# Patient Record
Sex: FEMALE | Race: WHITE | NOT HISPANIC OR LATINO | URBAN - METROPOLITAN AREA
[De-identification: names, ages, dates, MRNs, and addresses within clinical notes are randomized per-mention and may not be internally consistent; named-entity substitution may affect disease eponyms.]

---

## 2018-07-30 ENCOUNTER — APPOINTMENT (OUTPATIENT)
Dept: WOMENS IMAGING | Facility: HOSPITAL | Age: 48
End: 2018-07-30

## 2018-07-30 PROCEDURE — 77065 DX MAMMO INCL CAD UNI: CPT | Performed by: RADIOLOGY

## 2018-07-30 PROCEDURE — 77061 BREAST TOMOSYNTHESIS UNI: CPT | Performed by: RADIOLOGY

## 2018-07-30 PROCEDURE — G0279 TOMOSYNTHESIS, MAMMO: HCPCS | Performed by: RADIOLOGY

## 2018-07-30 PROCEDURE — 76641 ULTRASOUND BREAST COMPLETE: CPT | Performed by: RADIOLOGY

## 2020-03-12 ENCOUNTER — OFFICE (OUTPATIENT)
Dept: URBAN - METROPOLITAN AREA CLINIC 94 | Facility: CLINIC | Age: 50
End: 2020-03-12
Payer: COMMERCIAL

## 2020-03-12 VITALS
WEIGHT: 165 LBS | HEIGHT: 66 IN | HEART RATE: 109 BPM | DIASTOLIC BLOOD PRESSURE: 81 MMHG | SYSTOLIC BLOOD PRESSURE: 132 MMHG

## 2020-03-12 DIAGNOSIS — K31.84 GASTROPARESIS: ICD-10-CM

## 2020-03-12 DIAGNOSIS — R14.2 ERUCTATION: ICD-10-CM

## 2020-03-12 DIAGNOSIS — R14.0 ABDOMINAL DISTENSION (GASEOUS): ICD-10-CM

## 2020-03-12 DIAGNOSIS — K21.9 GASTRO-ESOPHAGEAL REFLUX DISEASE WITHOUT ESOPHAGITIS: ICD-10-CM

## 2020-03-12 DIAGNOSIS — K85.90 ACUTE PANCREATITIS WITHOUT NECROSIS OR INFECTION, UNSPECIFIE: ICD-10-CM

## 2020-03-12 DIAGNOSIS — R93.3 ABNORMAL FINDINGS ON DIAGNOSTIC IMAGING OF OTHER PARTS OF DI: ICD-10-CM

## 2020-03-12 DIAGNOSIS — K75.81 NONALCOHOLIC STEATOHEPATITIS (NASH): ICD-10-CM

## 2020-03-12 DIAGNOSIS — E11.9 TYPE 2 DIABETES MELLITUS WITHOUT COMPLICATIONS: ICD-10-CM

## 2020-03-12 PROCEDURE — 99244 OFF/OP CNSLTJ NEW/EST MOD 40: CPT | Performed by: INTERNAL MEDICINE

## 2020-03-12 RX ORDER — METOCLOPRAMIDE 5 MG/1
TABLET ORAL
Qty: 120 | Refills: 1 | Status: ACTIVE

## 2020-04-07 VITALS — HEIGHT: 66 IN | WEIGHT: 165 LBS

## 2020-04-09 ENCOUNTER — TELEHEALTH PROVIDED OTHER THAN IN PATIENT'S HOME (OUTPATIENT)
Dept: URBAN - METROPOLITAN AREA TELEHEALTH 6 | Facility: TELEHEALTH | Age: 50
End: 2020-04-09
Payer: COMMERCIAL

## 2020-04-09 DIAGNOSIS — K85.90 ACUTE PANCREATITIS WITHOUT NECROSIS OR INFECTION, UNSPECIFIE: ICD-10-CM

## 2020-04-09 DIAGNOSIS — R93.3 ABNORMAL FINDINGS ON DIAGNOSTIC IMAGING OF OTHER PARTS OF DI: ICD-10-CM

## 2020-04-09 DIAGNOSIS — K21.9 GASTRO-ESOPHAGEAL REFLUX DISEASE WITHOUT ESOPHAGITIS: ICD-10-CM

## 2020-04-09 DIAGNOSIS — R14.0 ABDOMINAL DISTENSION (GASEOUS): ICD-10-CM

## 2020-04-09 DIAGNOSIS — K31.84 GASTROPARESIS: ICD-10-CM

## 2020-04-09 DIAGNOSIS — E11.9 TYPE 2 DIABETES MELLITUS WITHOUT COMPLICATIONS: ICD-10-CM

## 2020-04-09 DIAGNOSIS — R14.2 ERUCTATION: ICD-10-CM

## 2020-04-09 DIAGNOSIS — K75.81 NONALCOHOLIC STEATOHEPATITIS (NASH): ICD-10-CM

## 2020-04-09 DIAGNOSIS — Z12.11 ENCOUNTER FOR SCREENING FOR MALIGNANT NEOPLASM OF COLON: ICD-10-CM

## 2020-04-09 PROCEDURE — 99213 OFFICE O/P EST LOW 20 MIN: CPT | Mod: 95 | Performed by: INTERNAL MEDICINE

## 2020-04-09 NOTE — SERVICEHPINOTES
3/12/2020 I did have the pleasure to see MIGUELITO PA 49 female 1970 in our [facility] for a consultation today. I sincerely appreciate this kind referral. 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 vague historian seems to have been happening for a while maybe 2-3 months ago Johanne tried IBGard hard to tell if helpfulfatty liver -- has had a liver bx 2011 no records available then another liver biopsy during one of her several EUS exams of pancreas takes Vit E pancreatitis several yrs ago not recurrent attributed to Topirimatehas had 2 colonoscopies with Dr Tobin some rectal bleeding in past none nowBRheartburn controlled on OMEPBRlabs 12/2019 AST 18 ALT 27 alk phos 175  chol 243 Hb A1c 11.8%BRvit D is lo BRsugars are better per her report BRwent to [facility]V/pain last weekend she thought it was her pancreas but was told UTI CT was done BRshe does have early satiety she tells me she has gastroparesisBRPertinent positive symptoms include abdominal pain, belching, bloating, change in bowel habits, flatulence/rectal gas, heartburn/reflux, nausea, vomiting pertinent negative symptoms include black stools, constipation, diarrhea, difficulty swallowing, painful swallowing, mucous in stools, painful stools, rectal bleeding, rectal protusions, rectal urgency, soiling/incontinence, vomiting with blood.BR======================================== 4/9/20 Telemedicine visit due to coronavirus pandemic --- BRnausea is better with Reglan 5 mg QID   nbspBRstill bloating is main complaint  also gas/flatulence BRreviewed CT scan but still no old records from Dr Tobin office   .

## 2020-07-16 ENCOUNTER — OFFICE (OUTPATIENT)
Dept: URBAN - METROPOLITAN AREA CLINIC 94 | Facility: CLINIC | Age: 50
End: 2020-07-16

## 2020-07-16 VITALS — WEIGHT: 166 LBS | HEIGHT: 66 IN

## 2020-07-16 DIAGNOSIS — R93.3 ABNORMAL FINDINGS ON DIAGNOSTIC IMAGING OF OTHER PARTS OF DI: ICD-10-CM

## 2020-07-16 DIAGNOSIS — R14.2 ERUCTATION: ICD-10-CM

## 2020-07-16 DIAGNOSIS — Z12.11 ENCOUNTER FOR SCREENING FOR MALIGNANT NEOPLASM OF COLON: ICD-10-CM

## 2020-07-16 DIAGNOSIS — K21.9 GASTRO-ESOPHAGEAL REFLUX DISEASE WITHOUT ESOPHAGITIS: ICD-10-CM

## 2020-07-16 DIAGNOSIS — K31.84 GASTROPARESIS: ICD-10-CM

## 2020-07-16 DIAGNOSIS — K59.00 CONSTIPATION, UNSPECIFIED: ICD-10-CM

## 2020-07-16 DIAGNOSIS — K85.90 ACUTE PANCREATITIS WITHOUT NECROSIS OR INFECTION, UNSPECIFIE: ICD-10-CM

## 2020-07-16 DIAGNOSIS — R11.0 NAUSEA: ICD-10-CM

## 2020-07-16 DIAGNOSIS — E11.9 TYPE 2 DIABETES MELLITUS WITHOUT COMPLICATIONS: ICD-10-CM

## 2020-07-16 DIAGNOSIS — K75.81 NONALCOHOLIC STEATOHEPATITIS (NASH): ICD-10-CM

## 2020-07-16 DIAGNOSIS — R14.0 ABDOMINAL DISTENSION (GASEOUS): ICD-10-CM

## 2020-07-16 PROCEDURE — 99214 OFFICE O/P EST MOD 30 MIN: CPT | Performed by: INTERNAL MEDICINE

## 2020-07-16 RX ORDER — PRUCALOPRIDE 2 MG/1
2 TABLET, FILM COATED ORAL
Qty: 30 | Refills: 11 | Status: ACTIVE
Start: 2020-07-16

## 2021-05-13 ENCOUNTER — TREATMENT (OUTPATIENT)
Dept: PHYSICAL THERAPY | Facility: CLINIC | Age: 51
End: 2021-05-13

## 2021-05-13 DIAGNOSIS — R29.898 LOSS OF MOVEMENT: ICD-10-CM

## 2021-05-13 DIAGNOSIS — R26.9 ABNORMAL GAIT: ICD-10-CM

## 2021-05-13 DIAGNOSIS — R29.3 POOR POSTURE: ICD-10-CM

## 2021-05-13 DIAGNOSIS — M54.42 CHRONIC LEFT-SIDED LOW BACK PAIN WITH LEFT-SIDED SCIATICA: Primary | ICD-10-CM

## 2021-05-13 DIAGNOSIS — G89.29 CHRONIC LEFT-SIDED LOW BACK PAIN WITH LEFT-SIDED SCIATICA: Primary | ICD-10-CM

## 2021-05-13 PROCEDURE — 97110 THERAPEUTIC EXERCISES: CPT | Performed by: PHYSICAL THERAPIST

## 2021-05-13 PROCEDURE — 97162 PT EVAL MOD COMPLEX 30 MIN: CPT | Performed by: PHYSICAL THERAPIST

## 2021-05-28 ENCOUNTER — TREATMENT (OUTPATIENT)
Dept: PHYSICAL THERAPY | Facility: CLINIC | Age: 51
End: 2021-05-28

## 2021-05-28 DIAGNOSIS — M54.42 CHRONIC LEFT-SIDED LOW BACK PAIN WITH LEFT-SIDED SCIATICA: Primary | ICD-10-CM

## 2021-05-28 DIAGNOSIS — G89.29 CHRONIC LEFT-SIDED LOW BACK PAIN WITH LEFT-SIDED SCIATICA: Primary | ICD-10-CM

## 2021-05-28 DIAGNOSIS — R29.3 POOR POSTURE: ICD-10-CM

## 2021-05-28 DIAGNOSIS — R26.9 ABNORMAL GAIT: ICD-10-CM

## 2021-05-28 DIAGNOSIS — R29.898 LOSS OF MOVEMENT: ICD-10-CM

## 2021-05-28 PROCEDURE — 97113 AQUATIC THERAPY/EXERCISES: CPT | Performed by: PHYSICAL THERAPIST

## 2021-06-02 ENCOUNTER — TREATMENT (OUTPATIENT)
Dept: PHYSICAL THERAPY | Facility: CLINIC | Age: 51
End: 2021-06-02

## 2021-06-02 DIAGNOSIS — R29.898 LOSS OF MOVEMENT: ICD-10-CM

## 2021-06-02 DIAGNOSIS — R29.3 POOR POSTURE: ICD-10-CM

## 2021-06-02 DIAGNOSIS — R26.9 ABNORMAL GAIT: ICD-10-CM

## 2021-06-02 DIAGNOSIS — G89.29 CHRONIC LEFT-SIDED LOW BACK PAIN WITH LEFT-SIDED SCIATICA: Primary | ICD-10-CM

## 2021-06-02 DIAGNOSIS — M54.42 CHRONIC LEFT-SIDED LOW BACK PAIN WITH LEFT-SIDED SCIATICA: Primary | ICD-10-CM

## 2021-06-02 PROCEDURE — 97113 AQUATIC THERAPY/EXERCISES: CPT | Performed by: PHYSICAL THERAPIST

## 2021-06-04 ENCOUNTER — TREATMENT (OUTPATIENT)
Dept: PHYSICAL THERAPY | Facility: CLINIC | Age: 51
End: 2021-06-04

## 2021-06-04 DIAGNOSIS — R29.3 POOR POSTURE: ICD-10-CM

## 2021-06-04 DIAGNOSIS — G89.29 CHRONIC LEFT-SIDED LOW BACK PAIN WITH LEFT-SIDED SCIATICA: Primary | ICD-10-CM

## 2021-06-04 DIAGNOSIS — R29.898 LOSS OF MOVEMENT: ICD-10-CM

## 2021-06-04 DIAGNOSIS — M54.42 CHRONIC LEFT-SIDED LOW BACK PAIN WITH LEFT-SIDED SCIATICA: Primary | ICD-10-CM

## 2021-06-04 DIAGNOSIS — R26.9 ABNORMAL GAIT: ICD-10-CM

## 2021-06-04 PROCEDURE — 97113 AQUATIC THERAPY/EXERCISES: CPT | Performed by: PHYSICAL THERAPIST

## 2021-06-08 ENCOUNTER — TREATMENT (OUTPATIENT)
Dept: PHYSICAL THERAPY | Facility: CLINIC | Age: 51
End: 2021-06-08

## 2021-06-08 DIAGNOSIS — R29.3 POOR POSTURE: ICD-10-CM

## 2021-06-08 DIAGNOSIS — G89.29 CHRONIC LEFT-SIDED LOW BACK PAIN WITH LEFT-SIDED SCIATICA: Primary | ICD-10-CM

## 2021-06-08 DIAGNOSIS — R26.9 ABNORMAL GAIT: ICD-10-CM

## 2021-06-08 DIAGNOSIS — R29.898 LOSS OF MOVEMENT: ICD-10-CM

## 2021-06-08 DIAGNOSIS — M54.42 CHRONIC LEFT-SIDED LOW BACK PAIN WITH LEFT-SIDED SCIATICA: Primary | ICD-10-CM

## 2021-06-08 PROCEDURE — 97113 AQUATIC THERAPY/EXERCISES: CPT | Performed by: PHYSICAL THERAPIST

## 2021-06-10 ENCOUNTER — TREATMENT (OUTPATIENT)
Dept: PHYSICAL THERAPY | Facility: CLINIC | Age: 51
End: 2021-06-10

## 2021-06-10 DIAGNOSIS — M54.42 CHRONIC LEFT-SIDED LOW BACK PAIN WITH LEFT-SIDED SCIATICA: Primary | ICD-10-CM

## 2021-06-10 DIAGNOSIS — G89.29 CHRONIC LEFT-SIDED LOW BACK PAIN WITH LEFT-SIDED SCIATICA: Primary | ICD-10-CM

## 2021-06-10 DIAGNOSIS — R26.9 ABNORMAL GAIT: ICD-10-CM

## 2021-06-10 DIAGNOSIS — R29.3 POOR POSTURE: ICD-10-CM

## 2021-06-10 DIAGNOSIS — R29.898 LOSS OF MOVEMENT: ICD-10-CM

## 2021-06-10 PROCEDURE — 97113 AQUATIC THERAPY/EXERCISES: CPT | Performed by: PHYSICAL THERAPIST

## 2021-06-15 ENCOUNTER — TREATMENT (OUTPATIENT)
Dept: PHYSICAL THERAPY | Facility: CLINIC | Age: 51
End: 2021-06-15

## 2021-06-15 DIAGNOSIS — R26.9 ABNORMAL GAIT: ICD-10-CM

## 2021-06-15 DIAGNOSIS — R29.898 LOSS OF MOVEMENT: ICD-10-CM

## 2021-06-15 DIAGNOSIS — R29.3 POOR POSTURE: ICD-10-CM

## 2021-06-15 DIAGNOSIS — G89.29 CHRONIC LEFT-SIDED LOW BACK PAIN WITH LEFT-SIDED SCIATICA: Primary | ICD-10-CM

## 2021-06-15 DIAGNOSIS — M54.42 CHRONIC LEFT-SIDED LOW BACK PAIN WITH LEFT-SIDED SCIATICA: Primary | ICD-10-CM

## 2021-06-15 PROCEDURE — 97113 AQUATIC THERAPY/EXERCISES: CPT | Performed by: PHYSICAL THERAPIST

## 2021-06-23 ENCOUNTER — TREATMENT (OUTPATIENT)
Dept: PHYSICAL THERAPY | Facility: CLINIC | Age: 51
End: 2021-06-23

## 2021-06-23 DIAGNOSIS — M54.42 CHRONIC LEFT-SIDED LOW BACK PAIN WITH LEFT-SIDED SCIATICA: Primary | ICD-10-CM

## 2021-06-23 DIAGNOSIS — R29.3 POOR POSTURE: ICD-10-CM

## 2021-06-23 DIAGNOSIS — G89.29 CHRONIC LEFT-SIDED LOW BACK PAIN WITH LEFT-SIDED SCIATICA: Primary | ICD-10-CM

## 2021-06-23 DIAGNOSIS — R29.898 LOSS OF MOVEMENT: ICD-10-CM

## 2021-06-23 DIAGNOSIS — R26.9 ABNORMAL GAIT: ICD-10-CM

## 2021-06-23 PROCEDURE — 97113 AQUATIC THERAPY/EXERCISES: CPT | Performed by: PHYSICAL THERAPIST

## 2021-07-07 ENCOUNTER — TREATMENT (OUTPATIENT)
Dept: PHYSICAL THERAPY | Facility: CLINIC | Age: 51
End: 2021-07-07

## 2021-07-07 DIAGNOSIS — G89.29 CHRONIC LEFT-SIDED LOW BACK PAIN WITH LEFT-SIDED SCIATICA: Primary | ICD-10-CM

## 2021-07-07 DIAGNOSIS — R29.898 LOSS OF MOVEMENT: ICD-10-CM

## 2021-07-07 DIAGNOSIS — R26.9 ABNORMAL GAIT: ICD-10-CM

## 2021-07-07 DIAGNOSIS — R29.3 POOR POSTURE: ICD-10-CM

## 2021-07-07 DIAGNOSIS — M54.42 CHRONIC LEFT-SIDED LOW BACK PAIN WITH LEFT-SIDED SCIATICA: Primary | ICD-10-CM

## 2021-07-07 PROCEDURE — 97113 AQUATIC THERAPY/EXERCISES: CPT | Performed by: PHYSICAL THERAPIST

## 2021-07-09 ENCOUNTER — TREATMENT (OUTPATIENT)
Dept: PHYSICAL THERAPY | Facility: CLINIC | Age: 51
End: 2021-07-09

## 2021-07-09 DIAGNOSIS — R26.9 ABNORMAL GAIT: ICD-10-CM

## 2021-07-09 DIAGNOSIS — G89.29 CHRONIC LEFT-SIDED LOW BACK PAIN WITH LEFT-SIDED SCIATICA: Primary | ICD-10-CM

## 2021-07-09 DIAGNOSIS — M54.42 CHRONIC LEFT-SIDED LOW BACK PAIN WITH LEFT-SIDED SCIATICA: Primary | ICD-10-CM

## 2021-07-09 DIAGNOSIS — R29.898 LOSS OF MOVEMENT: ICD-10-CM

## 2021-07-09 DIAGNOSIS — R29.3 POOR POSTURE: ICD-10-CM

## 2021-07-09 PROCEDURE — 97113 AQUATIC THERAPY/EXERCISES: CPT | Performed by: PHYSICAL THERAPIST

## 2021-07-14 ENCOUNTER — TREATMENT (OUTPATIENT)
Dept: PHYSICAL THERAPY | Facility: CLINIC | Age: 51
End: 2021-07-14

## 2021-07-14 DIAGNOSIS — M54.42 CHRONIC LEFT-SIDED LOW BACK PAIN WITH LEFT-SIDED SCIATICA: Primary | ICD-10-CM

## 2021-07-14 DIAGNOSIS — R29.3 POOR POSTURE: ICD-10-CM

## 2021-07-14 DIAGNOSIS — G89.29 CHRONIC LEFT-SIDED LOW BACK PAIN WITH LEFT-SIDED SCIATICA: Primary | ICD-10-CM

## 2021-07-14 DIAGNOSIS — R29.898 LOSS OF MOVEMENT: ICD-10-CM

## 2021-07-14 PROCEDURE — 97113 AQUATIC THERAPY/EXERCISES: CPT | Performed by: PHYSICAL THERAPIST

## 2021-07-16 ENCOUNTER — TREATMENT (OUTPATIENT)
Dept: PHYSICAL THERAPY | Facility: CLINIC | Age: 51
End: 2021-07-16

## 2021-07-16 DIAGNOSIS — M54.42 CHRONIC LEFT-SIDED LOW BACK PAIN WITH LEFT-SIDED SCIATICA: Primary | ICD-10-CM

## 2021-07-16 DIAGNOSIS — G89.29 CHRONIC LEFT-SIDED LOW BACK PAIN WITH LEFT-SIDED SCIATICA: Primary | ICD-10-CM

## 2021-07-16 DIAGNOSIS — R26.9 ABNORMAL GAIT: ICD-10-CM

## 2021-07-16 DIAGNOSIS — R29.3 POOR POSTURE: ICD-10-CM

## 2021-07-16 DIAGNOSIS — R29.898 LOSS OF MOVEMENT: ICD-10-CM

## 2021-07-16 PROCEDURE — 97113 AQUATIC THERAPY/EXERCISES: CPT | Performed by: PHYSICAL THERAPIST

## 2021-08-10 ENCOUNTER — TREATMENT (OUTPATIENT)
Dept: PHYSICAL THERAPY | Facility: CLINIC | Age: 51
End: 2021-08-10

## 2021-08-10 DIAGNOSIS — G89.29 CHRONIC LEFT-SIDED LOW BACK PAIN WITH LEFT-SIDED SCIATICA: Primary | ICD-10-CM

## 2021-08-10 DIAGNOSIS — M54.42 CHRONIC LEFT-SIDED LOW BACK PAIN WITH LEFT-SIDED SCIATICA: Primary | ICD-10-CM

## 2021-08-10 DIAGNOSIS — R29.898 LOSS OF MOVEMENT: ICD-10-CM

## 2021-08-10 DIAGNOSIS — R29.3 POOR POSTURE: ICD-10-CM

## 2021-08-10 DIAGNOSIS — R26.9 ABNORMAL GAIT: ICD-10-CM

## 2021-08-10 PROCEDURE — 97113 AQUATIC THERAPY/EXERCISES: CPT | Performed by: PHYSICAL THERAPIST

## 2021-08-12 ENCOUNTER — TREATMENT (OUTPATIENT)
Dept: PHYSICAL THERAPY | Facility: CLINIC | Age: 51
End: 2021-08-12

## 2021-08-12 DIAGNOSIS — R26.9 ABNORMAL GAIT: ICD-10-CM

## 2021-08-12 DIAGNOSIS — R29.3 POOR POSTURE: ICD-10-CM

## 2021-08-12 DIAGNOSIS — M54.42 CHRONIC LEFT-SIDED LOW BACK PAIN WITH LEFT-SIDED SCIATICA: Primary | ICD-10-CM

## 2021-08-12 DIAGNOSIS — R29.898 LOSS OF MOVEMENT: ICD-10-CM

## 2021-08-12 DIAGNOSIS — G89.29 CHRONIC LEFT-SIDED LOW BACK PAIN WITH LEFT-SIDED SCIATICA: Primary | ICD-10-CM

## 2021-08-12 PROCEDURE — 97113 AQUATIC THERAPY/EXERCISES: CPT | Performed by: PHYSICAL THERAPIST

## 2021-08-17 ENCOUNTER — TREATMENT (OUTPATIENT)
Dept: PHYSICAL THERAPY | Facility: CLINIC | Age: 51
End: 2021-08-17

## 2021-08-17 DIAGNOSIS — M54.42 CHRONIC LEFT-SIDED LOW BACK PAIN WITH LEFT-SIDED SCIATICA: Primary | ICD-10-CM

## 2021-08-17 DIAGNOSIS — R29.3 POOR POSTURE: ICD-10-CM

## 2021-08-17 DIAGNOSIS — G89.29 CHRONIC LEFT-SIDED LOW BACK PAIN WITH LEFT-SIDED SCIATICA: Primary | ICD-10-CM

## 2021-08-17 DIAGNOSIS — R29.898 LOSS OF MOVEMENT: ICD-10-CM

## 2021-08-17 DIAGNOSIS — R26.9 ABNORMAL GAIT: ICD-10-CM

## 2021-08-17 PROCEDURE — 97113 AQUATIC THERAPY/EXERCISES: CPT | Performed by: PHYSICAL THERAPIST

## 2021-08-26 ENCOUNTER — TREATMENT (OUTPATIENT)
Dept: PHYSICAL THERAPY | Facility: CLINIC | Age: 51
End: 2021-08-26

## 2021-08-26 DIAGNOSIS — R29.3 POOR POSTURE: ICD-10-CM

## 2021-08-26 DIAGNOSIS — R26.9 ABNORMAL GAIT: ICD-10-CM

## 2021-08-26 DIAGNOSIS — G89.29 CHRONIC LEFT-SIDED LOW BACK PAIN WITH LEFT-SIDED SCIATICA: Primary | ICD-10-CM

## 2021-08-26 DIAGNOSIS — R29.898 LOSS OF MOVEMENT: ICD-10-CM

## 2021-08-26 DIAGNOSIS — M54.42 CHRONIC LEFT-SIDED LOW BACK PAIN WITH LEFT-SIDED SCIATICA: Primary | ICD-10-CM

## 2021-08-26 PROCEDURE — 97113 AQUATIC THERAPY/EXERCISES: CPT | Performed by: PHYSICAL THERAPIST

## 2021-08-31 ENCOUNTER — TELEPHONE (OUTPATIENT)
Dept: PHYSICAL THERAPY | Facility: CLINIC | Age: 51
End: 2021-08-31

## 2021-09-02 ENCOUNTER — TREATMENT (OUTPATIENT)
Dept: PHYSICAL THERAPY | Facility: CLINIC | Age: 51
End: 2021-09-02

## 2021-09-02 DIAGNOSIS — M54.42 CHRONIC LEFT-SIDED LOW BACK PAIN WITH LEFT-SIDED SCIATICA: Primary | ICD-10-CM

## 2021-09-02 DIAGNOSIS — R26.9 ABNORMAL GAIT: ICD-10-CM

## 2021-09-02 DIAGNOSIS — G89.29 CHRONIC LEFT-SIDED LOW BACK PAIN WITH LEFT-SIDED SCIATICA: Primary | ICD-10-CM

## 2021-09-02 DIAGNOSIS — R29.3 POOR POSTURE: ICD-10-CM

## 2021-09-02 DIAGNOSIS — R29.898 LOSS OF MOVEMENT: ICD-10-CM

## 2021-09-02 PROCEDURE — 97113 AQUATIC THERAPY/EXERCISES: CPT | Performed by: PHYSICAL THERAPIST

## 2021-09-09 ENCOUNTER — TELEPHONE (OUTPATIENT)
Dept: PHYSICAL THERAPY | Facility: CLINIC | Age: 51
End: 2021-09-09

## 2021-09-09 ENCOUNTER — TREATMENT (OUTPATIENT)
Dept: PHYSICAL THERAPY | Facility: CLINIC | Age: 51
End: 2021-09-09

## 2021-09-23 ENCOUNTER — TREATMENT (OUTPATIENT)
Dept: PHYSICAL THERAPY | Facility: CLINIC | Age: 51
End: 2021-09-23

## 2021-09-23 DIAGNOSIS — R29.3 POOR POSTURE: ICD-10-CM

## 2021-09-23 DIAGNOSIS — G89.29 CHRONIC LEFT-SIDED LOW BACK PAIN WITH LEFT-SIDED SCIATICA: Primary | ICD-10-CM

## 2021-09-23 DIAGNOSIS — R26.9 ABNORMAL GAIT: ICD-10-CM

## 2021-09-23 DIAGNOSIS — M54.42 CHRONIC LEFT-SIDED LOW BACK PAIN WITH LEFT-SIDED SCIATICA: Primary | ICD-10-CM

## 2021-09-23 DIAGNOSIS — R29.898 LOSS OF MOVEMENT: ICD-10-CM

## 2021-09-23 PROCEDURE — 97110 THERAPEUTIC EXERCISES: CPT | Performed by: PHYSICAL THERAPIST

## 2021-12-14 ENCOUNTER — DOCUMENTATION (OUTPATIENT)
Dept: PHYSICAL THERAPY | Facility: CLINIC | Age: 51
End: 2021-12-14

## 2021-12-14 DIAGNOSIS — G89.29 CHRONIC LEFT-SIDED LOW BACK PAIN WITH LEFT-SIDED SCIATICA: Primary | ICD-10-CM

## 2021-12-14 DIAGNOSIS — R26.9 ABNORMAL GAIT: ICD-10-CM

## 2021-12-14 DIAGNOSIS — R29.3 POOR POSTURE: ICD-10-CM

## 2021-12-14 DIAGNOSIS — M54.42 CHRONIC LEFT-SIDED LOW BACK PAIN WITH LEFT-SIDED SCIATICA: Primary | ICD-10-CM

## 2021-12-14 DIAGNOSIS — R29.898 LOSS OF MOVEMENT: ICD-10-CM
